# Patient Record
Sex: FEMALE | URBAN - METROPOLITAN AREA
[De-identification: names, ages, dates, MRNs, and addresses within clinical notes are randomized per-mention and may not be internally consistent; named-entity substitution may affect disease eponyms.]

---

## 2020-06-17 ENCOUNTER — NURSE TRIAGE (OUTPATIENT)
Dept: NURSING | Facility: CLINIC | Age: 35
End: 2020-06-17

## 2020-06-17 NOTE — TELEPHONE ENCOUNTER
"\"I called my clinic earlier and I missed a return call from a Kierra\".  Sam is a patient at [facility].  She had called her clinic today after dropping a 15 pound dumbbell on her face and hitting her nose.  She was doing an abdominal exercise that required a weight to be held over head, it slipped and fell.  One end of the dumbbell also hit Lori by her ear.  Her nose took the majority of the hit.    Advised that I did not have access to her clinic charting, so I don't know what her PCP care team was calling her about, however based on what she is telling my about her injury, she should be seen in ER.    COVID 19 Nurse Triage Plan/Patient Instructions    Please be aware that novel coronavirus (COVID-19) may be circulating in the community. If you develop symptoms such as fever, cough, or SOB or if you have concerns about the presence of another infection including coronavirus (COVID-19), please contact your health care provider or visit [email or web address].     Disposition/Instructions    Patient to go to ED and follow protocol based instructions.     Bring Your Own Device:  Please also bring your smart device(s) (smart phones, tablets, laptops) and their charging cables for your personal use and to communicate with your care team during your visit.    Thank you for taking steps to prevent the spread of this virus.  o Limit your contact with others.  o Wear a simple mask to cover your cough.  o Wash your hands well and often.    Resources    [facility]: About COVID-19: [email or web address]    CDC: What to Do If You're Sick: www.cdc.gov/coronavirus/2019-ncov/about/steps-when-sick.html    CDC: Ending Home Isolation: www.cdc.gov/coronavirus/2019-ncov/hcp/disposition-in-home-patients.html     CDC: Caring for Someone: www.cdc.gov/coronavirus/2019-ncov/if-you-are-sick/care-for-someone.html     MD: Interim Guidance for Hospital Discharge to Home: " www.health.[facility].[place]/diseases/coronavirus/hcp/hospdischarge.pdf    [facility] clinical trials (COVID-19 research studies): [email or web address]     Below are the COVID-19 hotlines at the [place] [facility] ([facility]). Interpreters are available.   o For health questions: Call 206-001-3139 or 1-897.973.7318 (7 a.m. to 7 p.m.)  o For questions about schools and childcare: Call 413-127-3519 or 1-348.770.8171 (7 a.m. to 7 p.m.)       Reason for Disposition    Sounds like a serious injury to the triager    Additional Information    Negative: [1] Major bleeding (e.g., actively dripping or spurting) AND [2] can't be stopped    Negative: Bullet wound, knife wound, or other penetrating object    Negative: Difficulty breathing or choking    Negative: Knocked out (unconscious) > 1 minute    Negative: Difficult to awaken or acting confused (e.g., disoriented, slurred speech)    Negative: Severe neck pain    Negative: Sounds like a life-threatening emergency to the triager    Negative: [1] Injuries at more than 1 site AND [2] unsure which guideline to use    Negative: Injury mainly to forehead or head    Negative: Injury mainly to eye or orbit    Negative: Injury mainly to the ear    Negative: Injury mainly to the mouth    Injury mainly to the nose    Negative: [1] Major bleeding (e.g., actively dripping or spurting) AND [2] can't be stopped    Negative: Fainted or too weak to stand following large blood loss    Negative: Knocked out (unconscious) > 1 minute    Negative: Difficult to awaken or acting confused (e.g., disoriented, slurred speech)    Negative: Severe neck pain    Negative: Sounds like a life-threatening emergency to the triager    Negative: Wound looks infected    Negative: Nosebleed not from trauma    Negative: [1] Nosebleed AND [2] won't stop after 10 minutes of pinching the nostrils closed (applied twice)    Negative: [1] Skin bleeding AND [2] won't stop after 10  minutes of direct pressure    Negative: Skin is split open or gaping  (or length > 1/4 inch or 6 mm)    Negative: Very deformed or crooked nose    Protocols used: NOSE INJURY-A-AH, FACE INJURY-A-AH    Margy Viveros RN  [facility]